# Patient Record
Sex: FEMALE | Race: WHITE | Employment: FULL TIME | URBAN - METROPOLITAN AREA
[De-identification: names, ages, dates, MRNs, and addresses within clinical notes are randomized per-mention and may not be internally consistent; named-entity substitution may affect disease eponyms.]

---

## 2017-07-17 PROCEDURE — 87338 HPYLORI STOOL AG IA: CPT | Performed by: INTERNAL MEDICINE

## 2017-07-18 PROCEDURE — 88175 CYTOPATH C/V AUTO FLUID REDO: CPT | Performed by: OBSTETRICS & GYNECOLOGY

## 2017-07-18 PROCEDURE — 87491 CHLMYD TRACH DNA AMP PROBE: CPT | Performed by: OBSTETRICS & GYNECOLOGY

## 2017-07-18 PROCEDURE — 87591 N.GONORRHOEAE DNA AMP PROB: CPT | Performed by: OBSTETRICS & GYNECOLOGY

## 2017-08-01 PROCEDURE — 82656 EL-1 FECAL QUAL/SEMIQ: CPT | Performed by: INTERNAL MEDICINE

## 2017-08-01 PROCEDURE — 82705 FATS/LIPIDS FECES QUAL: CPT | Performed by: INTERNAL MEDICINE

## 2017-08-22 PROCEDURE — 88305 TISSUE EXAM BY PATHOLOGIST: CPT | Performed by: INTERNAL MEDICINE

## 2017-08-23 PROCEDURE — 87272 CRYPTOSPORIDIUM AG IF: CPT | Performed by: INTERNAL MEDICINE

## 2017-08-23 PROCEDURE — 87177 OVA AND PARASITES SMEARS: CPT | Performed by: INTERNAL MEDICINE

## 2017-08-23 PROCEDURE — 87329 GIARDIA AG IA: CPT | Performed by: INTERNAL MEDICINE

## 2017-08-23 PROCEDURE — 87209 SMEAR COMPLEX STAIN: CPT | Performed by: INTERNAL MEDICINE

## 2017-09-26 PROBLEM — B35.3 TINEA PEDIS, RIGHT: Status: ACTIVE | Noted: 2017-09-26

## 2017-10-18 PROCEDURE — 87591 N.GONORRHOEAE DNA AMP PROB: CPT | Performed by: OBSTETRICS & GYNECOLOGY

## 2017-10-18 PROCEDURE — 87491 CHLMYD TRACH DNA AMP PROBE: CPT | Performed by: OBSTETRICS & GYNECOLOGY

## 2017-11-03 PROCEDURE — 82943 ASSAY OF GLUCAGON: CPT | Performed by: INTERNAL MEDICINE

## 2017-11-03 PROCEDURE — 82308 ASSAY OF CALCITONIN: CPT | Performed by: INTERNAL MEDICINE

## 2017-11-03 PROCEDURE — 82941 ASSAY OF GASTRIN: CPT | Performed by: INTERNAL MEDICINE

## 2018-05-08 PROCEDURE — 81001 URINALYSIS AUTO W/SCOPE: CPT | Performed by: FAMILY MEDICINE

## 2018-05-08 PROCEDURE — 87086 URINE CULTURE/COLONY COUNT: CPT | Performed by: FAMILY MEDICINE

## 2019-06-20 PROCEDURE — 87046 STOOL CULTR AEROBIC BACT EA: CPT | Performed by: INTERNAL MEDICINE

## 2019-06-20 PROCEDURE — 87177 OVA AND PARASITES SMEARS: CPT | Performed by: INTERNAL MEDICINE

## 2019-06-20 PROCEDURE — 87272 CRYPTOSPORIDIUM AG IF: CPT | Performed by: INTERNAL MEDICINE

## 2019-06-20 PROCEDURE — 87329 GIARDIA AG IA: CPT | Performed by: INTERNAL MEDICINE

## 2019-06-20 PROCEDURE — 87209 SMEAR COMPLEX STAIN: CPT | Performed by: INTERNAL MEDICINE

## 2019-06-20 PROCEDURE — 87045 FECES CULTURE AEROBIC BACT: CPT | Performed by: INTERNAL MEDICINE

## 2019-06-20 PROCEDURE — 87427 SHIGA-LIKE TOXIN AG IA: CPT | Performed by: INTERNAL MEDICINE

## 2019-08-13 PROCEDURE — 87624 HPV HI-RISK TYP POOLED RSLT: CPT | Performed by: OBSTETRICS & GYNECOLOGY

## 2019-08-13 PROCEDURE — 88175 CYTOPATH C/V AUTO FLUID REDO: CPT | Performed by: OBSTETRICS & GYNECOLOGY

## 2025-02-06 ENCOUNTER — HOSPITAL ENCOUNTER (INPATIENT)
Facility: HOSPITAL | Age: 30
LOS: 4 days | Discharge: HOME | End: 2025-02-10
Attending: OBSTETRICS & GYNECOLOGY | Admitting: OBSTETRICS & GYNECOLOGY
Payer: COMMERCIAL

## 2025-02-06 ENCOUNTER — ANESTHESIA EVENT (OUTPATIENT)
Dept: OBGYN UNIT | Facility: HOSPITAL | Age: 30
End: 2025-02-06
Payer: COMMERCIAL

## 2025-02-06 ENCOUNTER — ANESTHESIA (OUTPATIENT)
Dept: OBGYN UNIT | Facility: HOSPITAL | Age: 30
End: 2025-02-06
Payer: COMMERCIAL

## 2025-02-06 PROBLEM — Z34.90 PREGNANCY (HCC): Status: ACTIVE | Noted: 2025-02-06

## 2025-02-06 LAB
ANTIBODY SCREEN: NEGATIVE
BASOPHILS # BLD AUTO: 0.05 X10(3) UL (ref 0–0.2)
BASOPHILS NFR BLD AUTO: 0.3 %
EOSINOPHIL # BLD AUTO: 0.07 X10(3) UL (ref 0–0.7)
EOSINOPHIL NFR BLD AUTO: 0.5 %
ERYTHROCYTE [DISTWIDTH] IN BLOOD BY AUTOMATED COUNT: 12.3 %
HCT VFR BLD AUTO: 33.8 %
HGB BLD-MCNC: 11.9 G/DL
IMM GRANULOCYTES # BLD AUTO: 0.16 X10(3) UL (ref 0–1)
IMM GRANULOCYTES NFR BLD: 1 %
LYMPHOCYTES # BLD AUTO: 2.48 X10(3) UL (ref 1–4)
LYMPHOCYTES NFR BLD AUTO: 16.1 %
MCH RBC QN AUTO: 31.5 PG (ref 26–34)
MCHC RBC AUTO-ENTMCNC: 35.2 G/DL (ref 31–37)
MCV RBC AUTO: 89.4 FL
MONOCYTES # BLD AUTO: 0.85 X10(3) UL (ref 0.1–1)
MONOCYTES NFR BLD AUTO: 5.5 %
NEUTROPHILS # BLD AUTO: 11.76 X10 (3) UL (ref 1.5–7.7)
NEUTROPHILS # BLD AUTO: 11.76 X10(3) UL (ref 1.5–7.7)
NEUTROPHILS NFR BLD AUTO: 76.6 %
PLATELET # BLD AUTO: 164 10(3)UL (ref 150–450)
RBC # BLD AUTO: 3.78 X10(6)UL
RH BLOOD TYPE: NEGATIVE
T PALLIDUM AB SER QL IA: NONREACTIVE
WBC # BLD AUTO: 15.4 X10(3) UL (ref 4–11)

## 2025-02-06 RX ORDER — BUPIVACAINE HCL/0.9 % NACL/PF 0.25 %
5 PLASTIC BAG, INJECTION (ML) EPIDURAL AS NEEDED
Status: DISCONTINUED | OUTPATIENT
Start: 2025-02-06 | End: 2025-02-07

## 2025-02-06 RX ORDER — TERBUTALINE SULFATE 1 MG/ML
0.25 INJECTION SUBCUTANEOUS AS NEEDED
Status: DISCONTINUED | OUTPATIENT
Start: 2025-02-06 | End: 2025-02-07 | Stop reason: HOSPADM

## 2025-02-06 RX ORDER — SODIUM CHLORIDE, SODIUM LACTATE, POTASSIUM CHLORIDE, CALCIUM CHLORIDE 600; 310; 30; 20 MG/100ML; MG/100ML; MG/100ML; MG/100ML
INJECTION, SOLUTION INTRAVENOUS CONTINUOUS
Status: DISCONTINUED | OUTPATIENT
Start: 2025-02-06 | End: 2025-02-07 | Stop reason: HOSPADM

## 2025-02-06 RX ORDER — NALBUPHINE HYDROCHLORIDE 10 MG/ML
2.5 INJECTION INTRAMUSCULAR; INTRAVENOUS; SUBCUTANEOUS
Status: DISCONTINUED | OUTPATIENT
Start: 2025-02-06 | End: 2025-02-07

## 2025-02-06 RX ORDER — DEXTROSE, SODIUM CHLORIDE, SODIUM LACTATE, POTASSIUM CHLORIDE, AND CALCIUM CHLORIDE 5; .6; .31; .03; .02 G/100ML; G/100ML; G/100ML; G/100ML; G/100ML
INJECTION, SOLUTION INTRAVENOUS AS NEEDED
Status: DISCONTINUED | OUTPATIENT
Start: 2025-02-06 | End: 2025-02-07 | Stop reason: HOSPADM

## 2025-02-06 RX ORDER — ACETAMINOPHEN 500 MG
1000 TABLET ORAL EVERY 6 HOURS PRN
Status: DISCONTINUED | OUTPATIENT
Start: 2025-02-06 | End: 2025-02-07 | Stop reason: HOSPADM

## 2025-02-06 RX ORDER — LIDOCAINE HYDROCHLORIDE AND EPINEPHRINE 15; 5 MG/ML; UG/ML
5 INJECTION, SOLUTION EPIDURAL AS NEEDED
Status: DISCONTINUED | OUTPATIENT
Start: 2025-02-06 | End: 2025-02-07

## 2025-02-06 RX ORDER — IBUPROFEN 600 MG/1
600 TABLET, FILM COATED ORAL ONCE AS NEEDED
Status: DISCONTINUED | OUTPATIENT
Start: 2025-02-06 | End: 2025-02-07 | Stop reason: HOSPADM

## 2025-02-06 RX ORDER — BUPIVACAINE HYDROCHLORIDE 2.5 MG/ML
30 INJECTION, SOLUTION EPIDURAL; INFILTRATION; INTRACAUDAL AS NEEDED
Status: DISCONTINUED | OUTPATIENT
Start: 2025-02-06 | End: 2025-02-07

## 2025-02-06 RX ORDER — ACETAMINOPHEN 500 MG
500 TABLET ORAL EVERY 6 HOURS PRN
Status: DISCONTINUED | OUTPATIENT
Start: 2025-02-06 | End: 2025-02-07 | Stop reason: HOSPADM

## 2025-02-06 RX ORDER — SODIUM CHLORIDE 9 MG/ML
10 INJECTION, SOLUTION INTRAMUSCULAR; INTRAVENOUS; SUBCUTANEOUS AS NEEDED
Status: DISCONTINUED | OUTPATIENT
Start: 2025-02-06 | End: 2025-02-07

## 2025-02-06 RX ORDER — LIDOCAINE HYDROCHLORIDE AND EPINEPHRINE 15; 5 MG/ML; UG/ML
INJECTION, SOLUTION EPIDURAL AS NEEDED
Status: DISCONTINUED | OUTPATIENT
Start: 2025-02-06 | End: 2025-02-08 | Stop reason: SURG

## 2025-02-06 RX ORDER — CITRIC ACID/SODIUM CITRATE 334-500MG
30 SOLUTION, ORAL ORAL AS NEEDED
Status: DISCONTINUED | OUTPATIENT
Start: 2025-02-06 | End: 2025-02-07 | Stop reason: HOSPADM

## 2025-02-06 RX ORDER — LIDOCAINE HYDROCHLORIDE 20 MG/ML
5 INJECTION, SOLUTION EPIDURAL; INFILTRATION; INTRACAUDAL; PERINEURAL AS NEEDED
Status: DISCONTINUED | OUTPATIENT
Start: 2025-02-06 | End: 2025-02-07

## 2025-02-06 RX ORDER — ONDANSETRON 2 MG/ML
4 INJECTION INTRAMUSCULAR; INTRAVENOUS EVERY 6 HOURS PRN
Status: DISCONTINUED | OUTPATIENT
Start: 2025-02-06 | End: 2025-02-07 | Stop reason: HOSPADM

## 2025-02-06 RX ADMIN — LIDOCAINE HYDROCHLORIDE AND EPINEPHRINE 2 ML: 15; 5 INJECTION, SOLUTION EPIDURAL at 23:08:00

## 2025-02-06 RX ADMIN — LIDOCAINE HYDROCHLORIDE AND EPINEPHRINE 3 ML: 15; 5 INJECTION, SOLUTION EPIDURAL at 23:03:00

## 2025-02-07 LAB — RH BLOOD TYPE: NEGATIVE

## 2025-02-07 PROCEDURE — 59514 CESAREAN DELIVERY ONLY: CPT | Performed by: OBSTETRICS & GYNECOLOGY

## 2025-02-07 RX ORDER — BISACODYL 10 MG
10 SUPPOSITORY, RECTAL RECTAL ONCE AS NEEDED
Status: DISCONTINUED | OUTPATIENT
Start: 2025-02-07 | End: 2025-02-10

## 2025-02-07 RX ORDER — ONDANSETRON 2 MG/ML
INJECTION INTRAMUSCULAR; INTRAVENOUS AS NEEDED
Status: DISCONTINUED | OUTPATIENT
Start: 2025-02-07 | End: 2025-02-08 | Stop reason: SURG

## 2025-02-07 RX ORDER — BUPIVACAINE HYDROCHLORIDE 2.5 MG/ML
INJECTION, SOLUTION EPIDURAL; INFILTRATION; INTRACAUDAL AS NEEDED
Status: DISCONTINUED | OUTPATIENT
Start: 2025-02-07 | End: 2025-02-08 | Stop reason: SURG

## 2025-02-07 RX ORDER — CHOLECALCIFEROL (VITAMIN D3) 25 MCG
1 TABLET,CHEWABLE ORAL DAILY
COMMUNITY

## 2025-02-07 RX ORDER — DOCUSATE SODIUM 100 MG/1
100 CAPSULE, LIQUID FILLED ORAL
Status: DISCONTINUED | OUTPATIENT
Start: 2025-02-07 | End: 2025-02-10

## 2025-02-07 RX ORDER — TRANEXAMIC ACID 10 MG/ML
INJECTION, SOLUTION INTRAVENOUS
Status: DISPENSED
Start: 2025-02-07 | End: 2025-02-08

## 2025-02-07 RX ORDER — LIDOCAINE HCL/EPINEPHRINE/PF 2%-1:200K
VIAL (ML) INJECTION AS NEEDED
Status: DISCONTINUED | OUTPATIENT
Start: 2025-02-07 | End: 2025-02-08 | Stop reason: SURG

## 2025-02-07 RX ORDER — IBUPROFEN 600 MG/1
600 TABLET, FILM COATED ORAL EVERY 6 HOURS
Status: DISCONTINUED | OUTPATIENT
Start: 2025-02-08 | End: 2025-02-10

## 2025-02-07 RX ORDER — KETOROLAC TROMETHAMINE 30 MG/ML
30 INJECTION, SOLUTION INTRAMUSCULAR; INTRAVENOUS EVERY 6 HOURS
Status: COMPLETED | OUTPATIENT
Start: 2025-02-07 | End: 2025-02-08

## 2025-02-07 RX ORDER — METOCLOPRAMIDE HYDROCHLORIDE 5 MG/ML
INJECTION INTRAMUSCULAR; INTRAVENOUS AS NEEDED
Status: DISCONTINUED | OUTPATIENT
Start: 2025-02-07 | End: 2025-02-08 | Stop reason: SURG

## 2025-02-07 RX ORDER — DEXTROSE, SODIUM CHLORIDE, SODIUM LACTATE, POTASSIUM CHLORIDE, AND CALCIUM CHLORIDE 5; .6; .31; .03; .02 G/100ML; G/100ML; G/100ML; G/100ML; G/100ML
INJECTION, SOLUTION INTRAVENOUS CONTINUOUS PRN
Status: DISCONTINUED | OUTPATIENT
Start: 2025-02-07 | End: 2025-02-10

## 2025-02-07 RX ORDER — ACETAMINOPHEN 500 MG
1000 TABLET ORAL ONCE
Status: DISCONTINUED | OUTPATIENT
Start: 2025-02-07 | End: 2025-02-07 | Stop reason: HOSPADM

## 2025-02-07 RX ORDER — KETOROLAC TROMETHAMINE 30 MG/ML
INJECTION, SOLUTION INTRAMUSCULAR; INTRAVENOUS AS NEEDED
Status: DISCONTINUED | OUTPATIENT
Start: 2025-02-07 | End: 2025-02-08 | Stop reason: SURG

## 2025-02-07 RX ORDER — HYDROMORPHONE HYDROCHLORIDE 1 MG/ML
0.3 INJECTION, SOLUTION INTRAMUSCULAR; INTRAVENOUS; SUBCUTANEOUS EVERY 2 HOUR PRN
Status: DISCONTINUED | OUTPATIENT
Start: 2025-02-07 | End: 2025-02-10

## 2025-02-07 RX ORDER — ECHINACEA PURPUREA EXTRACT 125 MG
1 TABLET ORAL
Status: DISCONTINUED | OUTPATIENT
Start: 2025-02-07 | End: 2025-02-07

## 2025-02-07 RX ORDER — ACETAMINOPHEN 500 MG
1000 TABLET ORAL EVERY 6 HOURS
Status: DISCONTINUED | OUTPATIENT
Start: 2025-02-07 | End: 2025-02-10

## 2025-02-07 RX ORDER — GABAPENTIN 300 MG/1
300 CAPSULE ORAL EVERY 8 HOURS PRN
Status: DISCONTINUED | OUTPATIENT
Start: 2025-02-07 | End: 2025-02-10

## 2025-02-07 RX ORDER — ONDANSETRON 2 MG/ML
4 INJECTION INTRAMUSCULAR; INTRAVENOUS EVERY 6 HOURS PRN
Status: DISCONTINUED | OUTPATIENT
Start: 2025-02-07 | End: 2025-02-10

## 2025-02-07 RX ORDER — OXYCODONE HYDROCHLORIDE 5 MG/1
5 TABLET ORAL EVERY 6 HOURS PRN
Status: DISCONTINUED | OUTPATIENT
Start: 2025-02-07 | End: 2025-02-10

## 2025-02-07 RX ORDER — SIMETHICONE 80 MG
80 TABLET,CHEWABLE ORAL 3 TIMES DAILY PRN
Status: DISCONTINUED | OUTPATIENT
Start: 2025-02-07 | End: 2025-02-10

## 2025-02-07 RX ORDER — TRANEXAMIC ACID 10 MG/ML
INJECTION, SOLUTION INTRAVENOUS AS NEEDED
Status: DISCONTINUED | OUTPATIENT
Start: 2025-02-07 | End: 2025-02-08 | Stop reason: SURG

## 2025-02-07 RX ORDER — AMMONIA 15 % (W/V)
0.3 AMPUL (EA) INHALATION AS NEEDED
Status: DISCONTINUED | OUTPATIENT
Start: 2025-02-07 | End: 2025-02-10

## 2025-02-07 RX ORDER — DEXAMETHASONE SODIUM PHOSPHATE 4 MG/ML
VIAL (ML) INJECTION AS NEEDED
Status: DISCONTINUED | OUTPATIENT
Start: 2025-02-07 | End: 2025-02-08 | Stop reason: SURG

## 2025-02-07 RX ADMIN — LIDOCAINE HYDROCHLORIDE AND EPINEPHRINE 3 ML: 15; 5 INJECTION, SOLUTION EPIDURAL at 14:12:00

## 2025-02-07 RX ADMIN — LIDOCAINE HCL/EPINEPHRINE/PF 3 ML: 2%-1:200K VIAL (ML) INJECTION at 16:46:00

## 2025-02-07 RX ADMIN — BUPIVACAINE HYDROCHLORIDE 5 ML: 2.5 INJECTION, SOLUTION EPIDURAL; INFILTRATION; INTRACAUDAL at 14:18:00

## 2025-02-07 RX ADMIN — LIDOCAINE HCL/EPINEPHRINE/PF 5 ML: 2%-1:200K VIAL (ML) INJECTION at 16:42:00

## 2025-02-07 RX ADMIN — BUPIVACAINE HYDROCHLORIDE 6 ML: 2.5 INJECTION, SOLUTION EPIDURAL; INFILTRATION; INTRACAUDAL at 04:02:00

## 2025-02-07 RX ADMIN — TRANEXAMIC ACID 1000 MG: 10 INJECTION, SOLUTION INTRAVENOUS at 17:01:00

## 2025-02-07 RX ADMIN — ONDANSETRON 4 MG: 2 INJECTION INTRAMUSCULAR; INTRAVENOUS at 16:38:00

## 2025-02-07 RX ADMIN — KETOROLAC TROMETHAMINE 30 MG: 30 INJECTION, SOLUTION INTRAMUSCULAR; INTRAVENOUS at 17:27:00

## 2025-02-07 RX ADMIN — DEXAMETHASONE SODIUM PHOSPHATE 8 MG: 4 MG/ML VIAL (ML) INJECTION at 17:29:00

## 2025-02-07 RX ADMIN — LIDOCAINE HCL/EPINEPHRINE/PF 5 ML: 2%-1:200K VIAL (ML) INJECTION at 16:38:00

## 2025-02-07 RX ADMIN — METOCLOPRAMIDE HYDROCHLORIDE 10 MG: 5 INJECTION INTRAMUSCULAR; INTRAVENOUS at 16:38:00

## 2025-02-07 NOTE — ANESTHESIA PREPROCEDURE EVALUATION
PRE-OP EVALUATION    Patient Name: Isabelle Crockett    Admit Diagnosis: PREGNANCY  Pregnancy (HCC)    Pre-op Diagnosis: * No surgery found *        Anesthesia Procedure: LABOR ANALGESIA    * Surgery not found *    Pre-op vitals reviewed.  Temp: 99 °F (37.2 °C)  Pulse: 94  Resp: 19  BP: 129/83     There is no height or weight on file to calculate BMI.    Current medications reviewed.  Hospital Medications:   lactated ringers infusion   Intravenous Continuous    dextrose in lactated ringers 5% infusion   Intravenous PRN    lactated ringers IV bolus 500 mL  500 mL Intravenous PRN    acetaminophen (Tylenol Extra Strength) tab 500 mg  500 mg Oral Q6H PRN    acetaminophen (Tylenol Extra Strength) tab 1,000 mg  1,000 mg Oral Q6H PRN    ibuprofen (Motrin) tab 600 mg  600 mg Oral Once PRN    ondansetron (Zofran) 4 MG/2ML injection 4 mg  4 mg Intravenous Q6H PRN    oxyTOCIN in sodium chloride 0.9% (Pitocin) 30 Units/500mL infusion premix  62.5-900 paco-units/min Intravenous Continuous    terbutaline (Brethine) 1 MG/ML injection 0.25 mg  0.25 mg Subcutaneous PRN    sodium citrate-citric acid (Bicitra) 500-334 MG/5ML oral solution 30 mL  30 mL Oral PRN    lactated ringers IV bolus 1,000 mL  1,000 mL Intravenous Once    fentaNYL-bupivacaine 2 mcg/mL-0.125% in sodium chloride 0.9% 100 mL EPIDURAL infusion premix  12 mL/hr Epidural Continuous    fentaNYL (Sublimaze) 50 mcg/mL injection 100 mcg  100 mcg Epidural Once    lidocaine 1.5%-EPINEPHrine 1:200,000 (Xylocaine-Epinephrine) injection  5 mL Injection PRN    bupivacaine PF (Marcaine) 0.25% injection  30 mL Injection PRN    lidocaine PF (Xylocaine-MPF) 2% injection  5 mL Injection PRN    sodium chloride 0.9% PF injection 10 mL  10 mL Injection PRN    ePHEDrine (PF) 25 MG/5 ML injection 5 mg  5 mg Intravenous PRN    nalbuphine (Nubain) 10 mg/mL injection 2.5 mg  2.5 mg Intravenous Q15 Min PRN       Outpatient Medications:   Prescriptions Prior to Admission[1]    Allergies:  Patient has no known allergies.      Anesthesia Evaluation    Patient Active Problem List   Diagnosis    Tear of medial cartilage or meniscus of knee, current    Attention deficit    Tinea pedis, right    Pregnancy (HCC)        Past Medical History:    ADD (attention deficit disorder)    COVID    IBS (irritable bowel syndrome)    Ovarian cyst    STD exposure    Vertigo        Past Surgical History:   Procedure Laterality Date    Hand/finger surgery unlisted      Knee scope,med/lat menisectomy  9/19/2011    Performed by N[facility]IN[facility] at [facility], [facility]    Other surgical history  2006    lateral and medial meniscus tears right knee    Other surgical history  2/2014    L ovarian cystectomy    Other surgical history  3/2016    ORIF R hand    Other surgical history      benign breast bx, benign lumpectomy    Removal of ovarian cyst(s)      Upper gi endoscopy,diagnosis  08/22/2017    normal, gastric and SB Bx negative     Social History     Socioeconomic History    Marital status: Single   Tobacco Use    Smoking status: Never    Smokeless tobacco: Never   Vaping Use    Vaping status: Never Used   Substance and Sexual Activity    Alcohol use: Yes     Alcohol/week: 0.0 standard drinks of alcohol     Comment: 1x/wk - 2 drinks    Drug use: No    Sexual activity: Yes     Partners: Male     Birth control/protection: Rhythm     Comment: 1 male partner in past year - OCP, condom     History   Drug Use No     Available pre-op labs reviewed.  Lab Results   Component Value Date    WBC 15.4 (H) 02/06/2025    RBC 3.78 (L) 02/06/2025    HGB 11.9 (L) 02/06/2025    HCT 33.8 (L) 02/06/2025    MCV 89.4 02/06/2025    MCH 31.5 02/06/2025    MCHC 35.2 02/06/2025    RDW 12.3 02/06/2025    .0 02/06/2025               Airway      Mallampati: II  Mouth opening: >3 FB  TM distance: 4 - 6 cm  Neck ROM: full Cardiovascular      Rhythm: regular  Rate: normal     Dental    Dentition appears grossly intact          Pulmonary    Pulmonary exam normal.  Breath sounds clear to auscultation bilaterally.               Other findings              ASA: 2   Plan: epidural           Comment: I spoke with the patient and discussed the risks of epidural anesthesia, which include bleeding, infection, headache, hypotension, nerve injury, allergic reaction, and failed attempts. The patient understands and consents to receiving epidural anesthesia for labor.  Plan/risks discussed with: patient                Present on Admission:  **None**             [1]   Medications Prior to Admission   Medication Sig Dispense Refill Last Dose/Taking    amitriptyline 25 MG Oral Tab Take 12.5 mg (half tablet) daily at bedtime for the first two weeks and then increase to 25 mg (one full tablet) at bedtime daily. 90 tablet 0     predniSONE 10 MG Oral Tab Take 1 tablet (10 mg total) by mouth daily. 5 tablet 0     busPIRone 15 MG Oral Tab Take 1 tablet (15 mg total) by mouth 2 (two) times daily. 60 tablet 6

## 2025-02-07 NOTE — PLAN OF CARE
Problem: BIRTH - VAGINAL/ SECTION  Goal: Fetal and maternal status remain reassuring during the birth process  Description: INTERVENTIONS:  - Monitor vital signs  - Monitor fetal heart rate  - Monitor uterine activity  - Monitor labor progression (vaginal delivery)  - DVT prophylaxis (C/S delivery)  - Surgical antibiotic prophylaxis (C/S delivery)  Outcome: Progressing     Problem: PAIN - ADULT  Goal: Verbalizes/displays adequate comfort level or patient's stated pain goal  Description: INTERVENTIONS:  - Encourage pt to monitor pain and request assistance  - Assess pain using appropriate pain scale  - Administer analgesics based on type and severity of pain and evaluate response  - Implement non-pharmacological measures as appropriate and evaluate response  - Consider cultural and social influences on pain and pain management  - Manage/alleviate anxiety  - Utilize distraction and/or relaxation techniques  - Monitor for opioid side effects  - Notify MD/LIP if interventions unsuccessful or patient reports new pain  - Anticipate increased pain with activity and pre-medicate as appropriate  Outcome: Progressing     Problem: ANXIETY  Goal: Will report anxiety at manageable levels  Description: INTERVENTIONS:  - Administer medication as ordered  - Teach and rehearse alternative coping skills  - Provide emotional support with 1:1 interaction with staff  Outcome: Progressing     Problem: Patient/Family Goals  Goal: Patient/Family Long Term Goal  Description: Patient's Long Term Goal: Uncomplicated delivery    Interventions:  -   - See additional Care Plan goals for specific interventions  Outcome: Progressing  Goal: Patient/Family Short Term Goal  Description: Patient's Short Term Goal: Adequate Pain Control    Interventions:   -   - See additional Care Plan goals for specific interventions  Outcome: Progressing

## 2025-02-07 NOTE — H&P
Pt is 28 y/o G1 at 38w6d who presented with c/o ctx's - had been 3+ in the office - was felt to be the same initially in triage and cx was very posterior - she walked and although cx was still very posterior was felt to 5 cm dilated - she was admitted - I attempted to AROM but she was too uncomfortable to tolerate the exam - so epidural was placed.    PMH - RH neg               GB neg               H/O IBS               Left breast lumpectomy               Laparoscopy for left ovarian cyst               Right hand fx - plate and screws               Bilateral knee scopes - gymnast  /81   Pulse 75   Temp 98.9 °F (37.2 °C) (Oral)   Resp 19   Wt 190 lb (86.2 kg)   SpO2 95%   BMI 31.62 kg/m²   Lungs - clear  FHT's - baseline 125 - moderate variability - + accels - no decels - cat 1  Ctx's q 3 to 4 minutes  VE 5/80-1   AROM - blood tinged fluid  A/P Term IUP - Minimal change in exam except cx is now not as posterior - if no increase in ctx pattern over the next hour - will start pitocin.

## 2025-02-07 NOTE — ANESTHESIA PROCEDURE NOTES
Labor Analgesia    Date/Time: 2/7/2025 2:05 PM    Performed by: Lesa Black MD  Authorized by: Art Miller MD      General Information and Staff    Start Time:  2/7/2025 2:05 PM  End Time:  2/7/2025 2:12 PM  Anesthesiologist:  Lesa Black MD  Performed by:  Anesthesiologist  Patient Location:  OB  Site Identification: surface landmarks    Reason for Block: labor epidural    Preanesthetic Checklist: patient identified, IV checked, risks and benefits discussed, monitors and equipment checked, pre-op evaluation, timeout performed, IV bolus, anesthesia consent and sterile technique used      Procedure Details    Patient Position:  Sitting  Prep: ChloraPrep    Monitoring:  Heart rate and continuous pulse ox  Approach:  Midline    Epidural Needle    Injection Technique:   Needle Type:  Tuohy  Needle Gauge:  17 G  Needle Length:  3.375 in  Needle Insertion Depth:  5  Location:  L2-3    Spinal Needle      Catheter    Catheter Type:  End hole  Catheter Size:  19 G  Catheter at Skin Depth:  12  Test Dose:  Negative    Assessment    Sensory Level:  T10    Additional Comments

## 2025-02-07 NOTE — PLAN OF CARE
Problem: BIRTH - VAGINAL/ SECTION  Goal: Fetal and maternal status remain reassuring during the birth process  Description: INTERVENTIONS:  - Monitor vital signs  - Monitor fetal heart rate  - Monitor uterine activity  - Monitor labor progression (vaginal delivery)  - DVT prophylaxis (C/S delivery)  - Surgical antibiotic prophylaxis (C/S delivery)  Outcome: Completed     Problem: PAIN - ADULT  Goal: Verbalizes/displays adequate comfort level or patient's stated pain goal  Description: INTERVENTIONS:  - Encourage pt to monitor pain and request assistance  - Assess pain using appropriate pain scale  - Administer analgesics based on type and severity of pain and evaluate response  - Implement non-pharmacological measures as appropriate and evaluate response  - Consider cultural and social influences on pain and pain management  - Manage/alleviate anxiety  - Utilize distraction and/or relaxation techniques  - Monitor for opioid side effects  - Notify MD/LIP if interventions unsuccessful or patient reports new pain  - Anticipate increased pain with activity and pre-medicate as appropriate  Outcome: Completed     Problem: ANXIETY  Goal: Will report anxiety at manageable levels  Description: INTERVENTIONS:  - Administer medication as ordered  - Teach and rehearse alternative coping skills  - Provide emotional support with 1:1 interaction with staff  Outcome: Completed     Problem: Patient/Family Goals  Goal: Patient/Family Long Term Goal  Description: Patient's Long Term Goal: Uncomplicated delivery    Interventions:  -   - See additional Care Plan goals for specific interventions  Outcome: Completed  Goal: Patient/Family Short Term Goal  Description: Patient's Short Term Goal: Adequate Pain Control    Interventions:   -   - See additional Care Plan goals for specific interventions  Outcome: Completed

## 2025-02-07 NOTE — ANESTHESIA PROCEDURE NOTES
Labor Analgesia    Date/Time: 2/6/2025 10:53 PM    Performed by: Art Miller MD  Authorized by: Art Miller MD      General Information and Staff    Start Time:  2/6/2025 10:53 PM  End Time:  2/6/2025 11:02 PM  Anesthesiologist:  Art Miller MD  Performed by:  Anesthesiologist  Patient Location:  OB  Site Identification: surface landmarks    Reason for Block: labor epidural    Preanesthetic Checklist: patient identified, IV checked, risks and benefits discussed, monitors and equipment checked, pre-op evaluation, timeout performed, IV bolus, anesthesia consent and sterile technique used      Procedure Details    Patient Position:  Sitting  Prep: ChloraPrep    Monitoring:  Heart rate and continuous pulse ox  Approach:  Midline    Epidural Needle    Injection Technique:  FANTASMA saline  Needle Type:  Tuohy  Needle Gauge:  17 G  Needle Length:  3.375 in  Needle Insertion Depth:  5  Location:  L4-5    Spinal Needle      Catheter    Catheter Type:  End hole  Catheter Size:  19 G  Catheter at Skin Depth:  10  Test Dose:  Negative    Assessment      Additional Comments       Dural puncture epidural performed. Pt positioned sitting. Back prepped / draped. Local 1% lido plain infiltration of skin. 17g Touhy needle inserted @ L4-L5 interspace. FANTASMA @ 5 cm at the skin. 24g spinal needle inserted, + CSF return. Epidural catheter easily threaded to 10 cm at the skin. Negative aspiration test. 3cc 1.5% lido + epi test dose administered, negative. Catheter secured with tape and tegaderm. Pt positioned supine. Final 2cc test dose + 4 ml epidural solution + 100mcg fentanyl given as a hand bolus. Epidural pump started. Labor pain improving, pt comfortable.

## 2025-02-07 NOTE — PROGRESS NOTES
Pt is a 29 year old female admitted to TR5/TR5-A.     Chief Complaint   Patient presents with    R/o Labor      Pt is  38w5d intra-uterine pregnancy.  History obtained, consents signed. Oriented to room, staff, and plan of care.      Pt reports contractions q5min since 1700, Pt noted to be breathing c contractions at this time.  Denies vaginal bleeding or LOF.

## 2025-02-07 NOTE — PROGRESS NOTES
Pt is a 29 year old female admitted to 110/110-A.     Chief Complaint   Patient presents with    R/o Labor      Pt is  38w6d intra-uterine pregnancy.  History obtained, consents signed. Oriented to room, staff, and plan of care.

## 2025-02-07 NOTE — OPERATIVE REPORT
[facility]   Section - Operative Note    Isabelle Crockett Patient Status:  Inpatient    10/5/1995 MRN [id number]   Location [facility] LABOR & DELIVERY Attending Dolores Baker MD   Hosp Day # 1 PCP Lorelei Francois MD       Preoperative Diagnosis:   1. IUP at 38w6d  2. Labor  3. Arrest of descent  4. Rh negative       Postoperative Diagnosis:   1. Same  2. Delivered  3. Possible endometriosis on bilateral ovaries and anterior surface of uterus    Procedure: primary Low Transverse  Section    Primary surgeon: Charisse Ruiz MD    Assistant: Jovana Gallagher MD (level 3)    Indications:  This is a 29 year old  at 38w6d who presented for labor, progressed to complete and pushed for 3+hours, underwent csection for arrest of descent    Surgical Findings: Baby: boy    Weight:  3310g  APGAR 1': 8  APGAR 5': 9      Anesthesia :epidural    EBL: see EMR    Procedure:     The patient was prepped and draped in the usual sterile fashion. Newsome in place. A time out was performed and SCD’s were placed to decrease her risk for DVT and a dose of antibiotics was given preoperatively to decrease her risk for infection. Anesthesia was found to be adequate. A Pfannenstiel skin incision was made and extended down to the level of the fascia. The fascia was incised and extended laterally with Mcgrath scissors.  The fascia was then dissected off the rectus muscles bluntly.  The peritoneal cavity was entered atraumatically and extended superiorly and inferiorly with good visualization of the bladder.  Bladder blade was inserted.  Bladder was visualized and well below operative field.   A low transverse incision was created and extended laterally using blunt finger dissection. Clear fluid noted. The infant was noted to be in the OP position.  The head was delivered and the infant was bulb suctioned.  The remainder of the body was delivered without complication.  The infant was vigorously crying. The cord was  clamped and cut after a 30 second delay and infant was handed to the awaiting neonatologist. The placenta was delivered manually intact but required more extensive manual extraction with a 3 vessel cord.      The uterus was cleared of all clots and debris and exteriorized.  The bladder blade was placed.  Uterus, tubes and ovaries were normal in appearance. The first layer of the hysterotomy was closed using 0 vicryl.  A second imbricating layer was placed with 0 vircyl.  The incision was inspected for hemostasis. The uterus was placed back into the abdominal cavity.  Re-inspection of the hysterotomy, peritomeum and rectus muscles noted them to be entirely hemostatic. The peritoneum was closed with a 2-0 vicryl stitch in a running fashion. The fascia was closed with 0 vicryl in a running fashion.   The skin was closed in a subcuticular fashion using 3-0 vicryl.  Steristrips were placed.  The sponge and instrument counts were reported to me as being correct.  The patient tolerated the procedure and was stable to the recovery room.  The infant was stable to the recovery room.    Specimen:  placenta    Drains: sims    Condition:   stable    Complications: None; patient tolerated the procedure well.    Comment: The physician surgical assist provided aid in exposure, hemostasis, closure and other intraoperative technical functions to help the surgeon carry out a safe operation and optimize results.      Charisse Ruiz MD  2/7/2025  5:31 PM

## 2025-02-07 NOTE — PROGRESS NOTES
Patient called complete at 12:20, began pushing shortly after. Asked to evaluate patient at bedside 13:30, patient stating she has intense lower back pain, VE +1 station. Discussed options with patient including bolus of epidural, replacement of epidural vs csection. Risks of the procedure were discussed with the patient.  Risks include but are not limited to risk of infection, bleeding, damage to surrounding structures including the bowel, bladder, ureters and other major pelvic organs.     Epidural replaced. Re-evaluated patient, states she is much more comfortable and would like to try pushing again.     FHT reassuring    Will continue to monitor    ================================================================  Went to bedisde while pushing. After additional 1.5 hours of pushing, still at +1 station after multiple positions and with good maternal effort. Discussed with patient possible cephalopelvic disproportion. Patient in agreement with plan for primary csection for arrest of descent.     Charge rn and anesthesia notified

## 2025-02-08 LAB
BASOPHILS # BLD AUTO: 0.06 X10(3) UL (ref 0–0.2)
BASOPHILS NFR BLD AUTO: 0.2 %
EOSINOPHIL # BLD AUTO: 0.02 X10(3) UL (ref 0–0.7)
EOSINOPHIL NFR BLD AUTO: 0.1 %
ERYTHROCYTE [DISTWIDTH] IN BLOOD BY AUTOMATED COUNT: 12.9 %
HCT VFR BLD AUTO: 29.4 %
HGB BLD-MCNC: 10.5 G/DL
IMM GRANULOCYTES # BLD AUTO: 0.21 X10(3) UL (ref 0–1)
IMM GRANULOCYTES NFR BLD: 0.8 %
LYMPHOCYTES # BLD AUTO: 1.99 X10(3) UL (ref 1–4)
LYMPHOCYTES NFR BLD AUTO: 7.7 %
MCH RBC QN AUTO: 32.3 PG (ref 26–34)
MCHC RBC AUTO-ENTMCNC: 35.7 G/DL (ref 31–37)
MCV RBC AUTO: 90.5 FL
MONOCYTES # BLD AUTO: 1.13 X10(3) UL (ref 0.1–1)
MONOCYTES NFR BLD AUTO: 4.3 %
NEUTROPHILS # BLD AUTO: 22.6 X10 (3) UL (ref 1.5–7.7)
NEUTROPHILS # BLD AUTO: 22.6 X10(3) UL (ref 1.5–7.7)
NEUTROPHILS NFR BLD AUTO: 86.9 %
PLATELET # BLD AUTO: 148 10(3)UL (ref 150–450)
RBC # BLD AUTO: 3.25 X10(6)UL
WBC # BLD AUTO: 26 X10(3) UL (ref 4–11)

## 2025-02-08 NOTE — PROGRESS NOTES
Patient transferred to mother/baby room 1113 per cart in stable condition with baby and personal belongings.  Accompanied by  and staff.  Report given to mother/baby RN.

## 2025-02-08 NOTE — PROGRESS NOTES
POD #1  Pt without complaints - adequate pain control  /75 (BP Location: Right arm)   Pulse 83   Temp 97.7 °F (36.5 °C) (Oral)   Resp 18   Wt 190 lb (86.2 kg)   SpO2 99%   Breastfeeding No   BMI 31.62 kg/m²   Incision - C/D/I  Lochia - appropriate  Fundus firm at U-2  Extrem - NT  WBC - 26K  Hgb - 10.5  A/P Doing well - Will do circ tomorrow     Repeat CBC for downward trend in WBC     Advance diet     Nursing

## 2025-02-08 NOTE — PROGRESS NOTES
[facility] 1SW-J dory    Isabelle Crockett Patient Status:  Inpatient   Age/Gender 29 year old female MRN [id number]   Location [facility] 1SW-J Attending Dolores Baker MD   Hosp Day # 2 PCP Lorelei Francois MD      Anesthesia Pain Progress Note    Anesthesia Technique:   Epidural Anesthesia     Pain Management Technique:  In addition to available oral supplemental and IV medications  Patient received neuraxial preservative free morphine for post procedural pain control.    Post Procedure Pain Quality:    Adequate    Pain Management Side Effects:  None     /75 (BP Location: Right arm)   Pulse 83   Temp 97.7 °F (36.5 °C) (Oral)   Resp 18   Wt 86.2 kg (190 lb)   SpO2 99%   Breastfeeding No   BMI 31.62 kg/m²       Injection Site: Injection site is intact on inspection     Complications from Pain Management or Anesthesia:   None    All patient questions were answered.  Follow up pain management is separate from intraoperative anesthetic needs.  Pain care is transitioned to primary service, with management by oral medications.    Patient is upbeat, overall pleased with care.    Thank you for asking us to participate in the care of your patient.    Lit Mckeon MD, 25, 10:00 AM      Lit Mckeon MD, 25, 10:00 AM       Please let patient or caregiver know or leave message that refill request for BP med has/have come from the pharmacy. The refills have  been sent. The patient is due for a yearly Medicare physical and fasting labs.  Please help schedule this in the next we

## 2025-02-08 NOTE — PROGRESS NOTES
NURSING ADMISSION NOTE      Patient admitted via cart  ID bands verified with labor RN.  Oriented to room, care of plan reviewed and patient education discussed.  Safety precautions initiated.  Bed in low position.  Call light in reach.

## 2025-02-09 LAB
BASOPHILS # BLD AUTO: 0.04 X10(3) UL (ref 0–0.2)
BASOPHILS NFR BLD AUTO: 0.2 %
EOSINOPHIL # BLD AUTO: 0.18 X10(3) UL (ref 0–0.7)
EOSINOPHIL NFR BLD AUTO: 1 %
ERYTHROCYTE [DISTWIDTH] IN BLOOD BY AUTOMATED COUNT: 13.1 %
HCT VFR BLD AUTO: 26.2 %
HGB BLD-MCNC: 9.3 G/DL
IMM GRANULOCYTES # BLD AUTO: 0.23 X10(3) UL (ref 0–1)
IMM GRANULOCYTES NFR BLD: 1.3 %
LYMPHOCYTES # BLD AUTO: 2.29 X10(3) UL (ref 1–4)
LYMPHOCYTES NFR BLD AUTO: 13.3 %
MCH RBC QN AUTO: 32.1 PG (ref 26–34)
MCHC RBC AUTO-ENTMCNC: 35.5 G/DL (ref 31–37)
MCV RBC AUTO: 90.3 FL
MONOCYTES # BLD AUTO: 0.68 X10(3) UL (ref 0.1–1)
MONOCYTES NFR BLD AUTO: 3.9 %
NEUTROPHILS # BLD AUTO: 13.86 X10 (3) UL (ref 1.5–7.7)
NEUTROPHILS # BLD AUTO: 13.86 X10(3) UL (ref 1.5–7.7)
NEUTROPHILS NFR BLD AUTO: 80.3 %
PLATELET # BLD AUTO: 158 10(3)UL (ref 150–450)
RBC # BLD AUTO: 2.9 X10(6)UL
WBC # BLD AUTO: 17.3 X10(3) UL (ref 4–11)

## 2025-02-09 RX ORDER — DIPHENHYDRAMINE HYDROCHLORIDE, ZINC ACETATE 2; .1 G/100G; G/100G
1 CREAM TOPICAL 3 TIMES DAILY PRN
Status: DISCONTINUED | OUTPATIENT
Start: 2025-02-09 | End: 2025-02-10

## 2025-02-09 NOTE — PROGRESS NOTES
POD#2  Pt without complaints - adequate pain control  /77 (BP Location: Right arm)   Pulse 78   Temp 97.4 °F (36.3 °C) (Oral)   Resp 18   Wt 190 lb (86.2 kg)   SpO2 99%   Breastfeeding Yes   BMI 31.62 kg/m²   Fundus firm at U-2  Lochia - appropriate  Extrem - NT  WBC - 17 - down from 26  Hgb - 9.3  A/P Doing well - will proceed with circ - care discussed         Nursing         IV venofer

## 2025-02-10 VITALS
HEART RATE: 76 BPM | DIASTOLIC BLOOD PRESSURE: 81 MMHG | BODY MASS INDEX: 32 KG/M2 | RESPIRATION RATE: 16 BRPM | OXYGEN SATURATION: 99 % | WEIGHT: 190 LBS | TEMPERATURE: 98 F | SYSTOLIC BLOOD PRESSURE: 122 MMHG

## 2025-02-10 RX ORDER — HYDROCODONE BITARTRATE AND ACETAMINOPHEN 5; 325 MG/1; MG/1
1 TABLET ORAL EVERY 6 HOURS PRN
Qty: 15 TABLET | Refills: 0 | Status: SHIPPED | OUTPATIENT
Start: 2025-02-10

## 2025-02-10 RX ORDER — IBUPROFEN 600 MG/1
600 TABLET, FILM COATED ORAL EVERY 6 HOURS PRN
Qty: 25 TABLET | Refills: 0 | Status: SHIPPED | OUTPATIENT
Start: 2025-02-10

## 2025-02-10 RX ORDER — PSEUDOEPHEDRINE HCL 30 MG
100 TABLET ORAL 2 TIMES DAILY
Qty: 28 CAPSULE | Refills: 0 | Status: SHIPPED | OUTPATIENT
Start: 2025-02-10

## 2025-02-10 NOTE — DISCHARGE SUMMARY
[facility]   part of [facility]    Discharge Summary    Isabelle Crockett Patient Status:  Inpatient    10/5/1995 MRN [id number]   Location [facility] 1SW-J Attending Dolores Baker MD   Hosp Day # 4 PCP Lorelei Francois MD     Date of Admission: 2025 Disposition: Home    Date of Discharge: 2/10/2025    Admitting Diagnosis: PREGNANCY  Pregnancy ([facility])    Discharge Diagnosis:   Patient Active Problem List   Diagnosis    Tear of medial cartilage or meniscus of knee, current    Attention deficit    Tinea pedis, right    Pregnancy ([facility])     delivery delivered ([facility])       Reason for Admission: labor management    Physical Exam:   Vitals:    02/10/25 0740   BP: 122/81   Pulse: 76   Resp: 16   Temp: 97.5 °F (36.4 °C)       General: No acute distress  Pulm: nonlabored breathing  Cardiac: regular rate  Abd: soft, nontender, fundus firm below umbilicus, incision clean, dry, intact with steri strips  Ext: nontender lower extremities       History of Present Illness:   Pt is 30 y/o G1 at 38w6d who presented with c/o ctx's - had been 3+ in the office - was felt to be the same initially in triage and cx was very posterior - she walked and although cx was still very posterior was felt to 5 cm dilated - she was admitted - I attempted to AROM but she was too uncomfortable to tolerate the exam - so epidural was placed.    PMH - RH neg               GB neg               H/O IBS               Left breast lumpectomy               Laparoscopy for left ovarian cyst               Right hand fx - plate and screws               Bilateral knee scopes - [facility]    Hospital Course:   Patient was admitted in labor, she progressed to complete and then pushed for ~3 hours. Underwent PLTCS for arrest of descent. Narrow pelvis noted and baby in OP position. Postpartum course uncomplicated, she received IV iron.     Consultations: anesthesia    Procedures: PLTCS    Complications: none    Discharge Condition:  Good         Discharge Medications:      Discharge Medications        START taking these medications        Instructions Prescription details   docusate sodium 100 MG Caps  Commonly known as: COLACE      Take 100 mg by mouth 2 (two) times daily.   Quantity: 28 capsule  Refills: 0     HYDROcodone-acetaminophen 5-325 MG Tabs  Commonly known as: Norco      Take 1 tablet by mouth every 6 (six) hours as needed for Pain.   Quantity: 15 tablet  Refills: 0     ibuprofen 600 MG Tabs  Commonly known as: Motrin      Take 1 tablet (600 mg total) by mouth every 6 (six) hours as needed for Pain.   Quantity: 25 tablet  Refills: 0            CONTINUE taking these medications        Instructions Prescription details   amitriptyline 25 MG Tabs  Commonly known as: Elavil      Take 12.5 mg (half tablet) daily at bedtime for the first two weeks and then increase to 25 mg (one full tablet) at bedtime daily.   Quantity: 90 tablet  Refills: 0     prenatal vitamin with DHA 27-0.8-228 MG Caps      Take 1 capsule by mouth daily.   Refills: 0               Where to Get Your Medications        These medications were sent to [id number] - [place], [place] - [address]  [phone], [phone]  [address] [place] [address]      Phone: [phone]   docusate sodium 100 MG Caps  HYDROcodone-acetaminophen 5-325 MG Tabs  ibuprofen 600 MG Tabs         Follow up Visits: Follow-up with Dr. Ruiz in 6 week      Charisse Brisa Ruiz MD  2/10/2025  8:13 AM

## 2025-02-10 NOTE — PROGRESS NOTES
Discharge instructions reviewed with, and copy given to, patient.  Patient verbalized understanding of all instructions, and denied further questions.  Pt discharged in stable condition, with infant and fob.

## 2025-02-10 NOTE — PLAN OF CARE
Problem: POSTPARTUM  Goal: Long Term Goal:Experiences normal postpartum course  Description: INTERVENTIONS:  - Assess and monitor vital signs and lab values.  - Assess fundus and lochia.  - Provide ice/sitz baths for perineum discomfort.  - Monitor healing of incision/episiotomy/laceration, and assess for signs and symptoms of infection and hematoma.  - Assess bladder function and monitor for bladder distention.  - Provide/instruct/assist with pericare as needed.  - Provide VTE prophylaxis as needed.  - Monitor bowel function.  - Encourage ambulation and provide assistance as needed.  - Assess and monitor emotional status and provide social service/psych resources as needed.  - Utilize standard precautions and use personal protective equipment as indicated. Ensure aseptic care of all intravenous lines and invasive tubes/drains.  - Obtain immunization and exposure to communicable diseases history.  Outcome: Completed  Goal: Optimize infant feeding at the breast  Description: INTERVENTIONS:  - Initiate breast feeding within first hour after birth.   - Monitor effectiveness of current breast feeding efforts.  - Assess support systems available to mother/family.  - Identify cultural beliefs/practices regarding lactation, letdown techniques, maternal food preferences.  - Assess mother's knowledge and previous experience with breast feeding.  - Provide information as needed about early infant feeding cues (e.g., rooting, lip smacking, sucking fingers/hand) versus late cue of crying.  - Discuss/demonstrate breast feeding aids (e.g., infant sling, nursing footstool/pillows, and breast pumps).  - Encourage mother/other family members to express feelings/concerns, and actively listen.  - Educate father/SO about benefits of breast feeding and how to manage common lactation challenges.  - Recommend avoidance of specific medications or substances incompatible with breast feeding.  - Assess and monitor for signs of nipple  pain/trauma.  - Instruct and provide assistance with proper latch.  - Review techniques for milk expression (breast pumping) and storage of breast milk. Provide pumping equipment/supplies, instructions and assistance, as needed.  - Encourage rooming-in and breast feeding on demand.  - Encourage skin-to-skin contact.  - Provide LC support as needed.  - Assess for and manage engorgement.  - Provide breast feeding education handouts and information on community breast feeding support.   Outcome: Completed  Goal: Establishment of adequate milk supply with medication/procedure interruptions  Description: INTERVENTIONS:  - Review techniques for milk expression (breast pumping).   - Provide pumping equipment/supplies, instructions, and assistance until it is safe to breastfeed infant.  Outcome: Completed  Goal: Appropriate maternal -  bonding  Description: INTERVENTIONS:  - Assess caregiver- interactions.  - Assess caregiver's emotional status and coping mechanisms.  - Encourage caregiver to participate in  daily care.  - Assess support systems available to mother/family.  - Provide /case management support as needed.  Outcome: Completed     Problem: GASTROINTESTINAL - ADULT  Goal: Achieves appropriate nutritional intake (bariatric)  Description: INTERVENTIONS:  - Monitor for over-consumption  - Identify factors contributing to increased intake, treat as appropriate  - Monitor I&O, WT and lab values  - Obtain nutritional consult as needed  - Evaluate psychosocial factors contributing to over-consumption  Outcome: Completed

## 2025-02-11 NOTE — PAYOR COMM NOTE
--------------  ADMISSION REVIEW     Payor: [employer]/HMO/POS/EPO  Subscriber #:  [id number]  Authorization Number: [id number]    Admit date: 25  Admit time:  9:29 PM       :    NURSING:        Chief Complaint   Patient presents with    R/o Labor      Pt is  38w5d intra-uterine pregnancy.  History obtained, consents signed. Oriented to room, staff, and plan of care.        Pt reports contractions q5min since 1700, Pt noted to be breathing c contractions at this time.  Denies vaginal bleeding or LOF.       :    OBGYN:    Pt is 30 y/o G1 at 38w6d who presented with c/o ctx's - had been 3+ in the office - was felt to be the same initially in triage and cx was very posterior - she walked and although cx was still very posterior was felt to 5 cm dilated - she was admitted - I attempted to AROM but she was too uncomfortable to tolerate the exam - so epidural was placed.    PMH - RH neg               GB neg               H/O IBS               Left breast lumpectomy               Laparoscopy for left ovarian cyst               Right hand fx - plate and screws               Bilateral knee scopes - gymnast  /81   Pulse 75   Temp 98.9 °F (37.2 °C) (Oral)   Resp 19   Wt 190 lb (86.2 kg)   SpO2 95%   BMI 31.62 kg/m²   Lungs - clear  FHT's - baseline 125 - moderate variability - + accels - no decels - cat 1  Ctx's q 3 to 4 minutes  VE 5/80-1   AROM - blood tinged fluid  A/P Term IUP - Minimal change in exam except cx is now not as posterior - if no increase in ctx pattern over the next hour - will start pitocin.     Section - Operative Note           Isabelle Crockett Patient Status:  Inpatient    10/5/1995 MRN [id number]   [facility] LABOR & DELIVERY Attending Dolores Baker MD   Hosp Day # 1 PCP Lorelei Francois MD         Preoperative Diagnosis:   1. IUP at 38w6d  2. Labor  3. Arrest of descent  4. Rh negative                                            Postoperative Diagnosis:   1. Same  2. Delivered  3. Possible endometriosis on bilateral ovaries and anterior surface of uterus     Procedure: primary Low Transverse  Section     Indications:  This is a 29 year old  at 38w6d who presented for labor, progressed to complete and pushed for 3+hours, underwent csection for arrest of descent     Surgical Findings: Baby: boy     Weight:  3310g  APGAR 1': 8  APGAR 5': 9       2/10:     DC HOME

## 2025-02-12 ENCOUNTER — TELEPHONE (OUTPATIENT)
Dept: OBGYN UNIT | Facility: HOSPITAL | Age: 30
End: 2025-02-12

## 2025-02-24 NOTE — ANESTHESIA POSTPROCEDURE EVALUATION
[facility]    Isabelle Crockett Patient Status:  Inpatient   Age/Gender 29 year old female MRN [id number]   Location [facility] 1SW-J Attending No att. providers found   Hosp Day # 4 PCP Lorelei Francois MD       Anesthesia Post-op Note     SECTION    Procedure Summary       Date: 25 Room / Location:  L+D OR   L+D OR    Anesthesia Start:  Anesthesia Stop: 25    Procedure:  SECTION (Abdomen) Diagnosis: (Same)    Surgeons: Charisse Ruiz MD Anesthesiologist: Nolan Caceres MD    Anesthesia Type: epidural ASA Status: 2            Anesthesia Type: epidural    Vitals Value Taken Time   /81 02/10/25 0740   Temp 97.5 °F (36.4 °C) 02/10/25 0740   Pulse 76 02/10/25 0740   Resp 16 02/10/25 0740   SpO2 99 % 25 1600           Patient Location: PACU    Anesthesia Type: epidural

## 2025-03-26 RX ORDER — TRETINOIN 0.5 MG/G
CREAM TOPICAL
COMMUNITY
Start: 2023-09-11 | End: 2025-03-27

## 2025-03-26 RX ORDER — TRIAMCINOLONE ACETONIDE 1 MG/G
CREAM TOPICAL
COMMUNITY
Start: 2025-03-11 | End: 2025-03-27

## 2025-03-27 ENCOUNTER — ANESTHESIA (OUTPATIENT)
Dept: SURGERY | Facility: HOSPITAL | Age: 30
End: 2025-03-27
Payer: COMMERCIAL

## 2025-03-27 ENCOUNTER — APPOINTMENT (OUTPATIENT)
Dept: ULTRASOUND IMAGING | Facility: HOSPITAL | Age: 30
End: 2025-03-27
Attending: STUDENT IN AN ORGANIZED HEALTH CARE EDUCATION/TRAINING PROGRAM
Payer: COMMERCIAL

## 2025-03-27 ENCOUNTER — ANESTHESIA EVENT (OUTPATIENT)
Dept: SURGERY | Facility: HOSPITAL | Age: 30
End: 2025-03-27
Payer: COMMERCIAL

## 2025-03-27 ENCOUNTER — HOSPITAL ENCOUNTER (OUTPATIENT)
Facility: HOSPITAL | Age: 30
Setting detail: HOSPITAL OUTPATIENT SURGERY
Discharge: HOME | End: 2025-03-27
Attending: STUDENT IN AN ORGANIZED HEALTH CARE EDUCATION/TRAINING PROGRAM | Admitting: STUDENT IN AN ORGANIZED HEALTH CARE EDUCATION/TRAINING PROGRAM
Payer: COMMERCIAL

## 2025-03-27 VITALS
RESPIRATION RATE: 16 BRPM | WEIGHT: 163 LBS | HEIGHT: 65 IN | SYSTOLIC BLOOD PRESSURE: 106 MMHG | BODY MASS INDEX: 27.16 KG/M2 | OXYGEN SATURATION: 100 % | HEART RATE: 69 BPM | DIASTOLIC BLOOD PRESSURE: 76 MMHG | TEMPERATURE: 97 F

## 2025-03-27 PROCEDURE — 10D18ZZ EXTRACTION OF PRODUCTS OF CONCEPTION, RETAINED, VIA NATURAL OR ARTIFICIAL OPENING ENDOSCOPIC: ICD-10-PCS | Performed by: STUDENT IN AN ORGANIZED HEALTH CARE EDUCATION/TRAINING PROGRAM

## 2025-03-27 PROCEDURE — 88305 TISSUE EXAM BY PATHOLOGIST: CPT | Performed by: STUDENT IN AN ORGANIZED HEALTH CARE EDUCATION/TRAINING PROGRAM

## 2025-03-27 PROCEDURE — 76998 US GUIDE INTRAOP: CPT | Performed by: STUDENT IN AN ORGANIZED HEALTH CARE EDUCATION/TRAINING PROGRAM

## 2025-03-27 RX ORDER — SODIUM CHLORIDE, SODIUM LACTATE, POTASSIUM CHLORIDE, CALCIUM CHLORIDE 600; 310; 30; 20 MG/100ML; MG/100ML; MG/100ML; MG/100ML
INJECTION, SOLUTION INTRAVENOUS CONTINUOUS
Status: DISCONTINUED | OUTPATIENT
Start: 2025-03-27 | End: 2025-03-27

## 2025-03-27 RX ORDER — PROCHLORPERAZINE EDISYLATE 5 MG/ML
5 INJECTION INTRAMUSCULAR; INTRAVENOUS EVERY 8 HOURS PRN
Status: DISCONTINUED | OUTPATIENT
Start: 2025-03-27 | End: 2025-03-27

## 2025-03-27 RX ORDER — ONDANSETRON 2 MG/ML
4 INJECTION INTRAMUSCULAR; INTRAVENOUS EVERY 6 HOURS PRN
Status: DISCONTINUED | OUTPATIENT
Start: 2025-03-27 | End: 2025-03-27

## 2025-03-27 RX ORDER — DEXAMETHASONE SODIUM PHOSPHATE 4 MG/ML
VIAL (ML) INJECTION AS NEEDED
Status: DISCONTINUED | OUTPATIENT
Start: 2025-03-27 | End: 2025-03-27 | Stop reason: SURG

## 2025-03-27 RX ORDER — MISOPROSTOL 200 UG/1
TABLET ORAL AS NEEDED
Status: DISCONTINUED | OUTPATIENT
Start: 2025-03-27 | End: 2025-03-27 | Stop reason: HOSPADM

## 2025-03-27 RX ORDER — KETOROLAC TROMETHAMINE 30 MG/ML
INJECTION, SOLUTION INTRAMUSCULAR; INTRAVENOUS AS NEEDED
Status: DISCONTINUED | OUTPATIENT
Start: 2025-03-27 | End: 2025-03-27 | Stop reason: SURG

## 2025-03-27 RX ORDER — HYDROCODONE BITARTRATE AND ACETAMINOPHEN 5; 325 MG/1; MG/1
1 TABLET ORAL ONCE AS NEEDED
Status: DISCONTINUED | OUTPATIENT
Start: 2025-03-27 | End: 2025-03-27

## 2025-03-27 RX ORDER — MIDAZOLAM HYDROCHLORIDE 1 MG/ML
1 INJECTION INTRAMUSCULAR; INTRAVENOUS EVERY 5 MIN PRN
Status: DISCONTINUED | OUTPATIENT
Start: 2025-03-27 | End: 2025-03-27

## 2025-03-27 RX ORDER — ONDANSETRON 2 MG/ML
INJECTION INTRAMUSCULAR; INTRAVENOUS AS NEEDED
Status: DISCONTINUED | OUTPATIENT
Start: 2025-03-27 | End: 2025-03-27 | Stop reason: SURG

## 2025-03-27 RX ORDER — ACETAMINOPHEN 500 MG
1000 TABLET ORAL ONCE AS NEEDED
Status: DISCONTINUED | OUTPATIENT
Start: 2025-03-27 | End: 2025-03-27

## 2025-03-27 RX ORDER — ACETAMINOPHEN 500 MG
1000 TABLET ORAL ONCE
Status: DISCONTINUED | OUTPATIENT
Start: 2025-03-27 | End: 2025-03-27 | Stop reason: HOSPADM

## 2025-03-27 RX ORDER — MIDAZOLAM HYDROCHLORIDE 1 MG/ML
INJECTION INTRAMUSCULAR; INTRAVENOUS AS NEEDED
Status: DISCONTINUED | OUTPATIENT
Start: 2025-03-27 | End: 2025-03-27 | Stop reason: SURG

## 2025-03-27 RX ORDER — LIDOCAINE HYDROCHLORIDE 10 MG/ML
INJECTION, SOLUTION EPIDURAL; INFILTRATION; INTRACAUDAL; PERINEURAL AS NEEDED
Status: DISCONTINUED | OUTPATIENT
Start: 2025-03-27 | End: 2025-03-27 | Stop reason: SURG

## 2025-03-27 RX ORDER — HYDROMORPHONE HYDROCHLORIDE 1 MG/ML
0.6 INJECTION, SOLUTION INTRAMUSCULAR; INTRAVENOUS; SUBCUTANEOUS EVERY 5 MIN PRN
Status: DISCONTINUED | OUTPATIENT
Start: 2025-03-27 | End: 2025-03-27

## 2025-03-27 RX ORDER — HYDROCODONE BITARTRATE AND ACETAMINOPHEN 5; 325 MG/1; MG/1
2 TABLET ORAL ONCE AS NEEDED
Status: DISCONTINUED | OUTPATIENT
Start: 2025-03-27 | End: 2025-03-27

## 2025-03-27 RX ORDER — NALOXONE HYDROCHLORIDE 0.4 MG/ML
80 INJECTION, SOLUTION INTRAMUSCULAR; INTRAVENOUS; SUBCUTANEOUS AS NEEDED
Status: DISCONTINUED | OUTPATIENT
Start: 2025-03-27 | End: 2025-03-27

## 2025-03-27 RX ORDER — HYDROMORPHONE HYDROCHLORIDE 1 MG/ML
0.2 INJECTION, SOLUTION INTRAMUSCULAR; INTRAVENOUS; SUBCUTANEOUS EVERY 5 MIN PRN
Status: DISCONTINUED | OUTPATIENT
Start: 2025-03-27 | End: 2025-03-27

## 2025-03-27 RX ORDER — HYDROMORPHONE HYDROCHLORIDE 1 MG/ML
0.4 INJECTION, SOLUTION INTRAMUSCULAR; INTRAVENOUS; SUBCUTANEOUS EVERY 5 MIN PRN
Status: DISCONTINUED | OUTPATIENT
Start: 2025-03-27 | End: 2025-03-27

## 2025-03-27 RX ORDER — LABETALOL HYDROCHLORIDE 5 MG/ML
5 INJECTION, SOLUTION INTRAVENOUS EVERY 5 MIN PRN
Status: DISCONTINUED | OUTPATIENT
Start: 2025-03-27 | End: 2025-03-27

## 2025-03-27 RX ADMIN — ONDANSETRON 4 MG: 2 INJECTION INTRAMUSCULAR; INTRAVENOUS at 08:55:00

## 2025-03-27 RX ADMIN — MIDAZOLAM HYDROCHLORIDE 2 MG: 1 INJECTION INTRAMUSCULAR; INTRAVENOUS at 08:40:00

## 2025-03-27 RX ADMIN — KETOROLAC TROMETHAMINE 30 MG: 30 INJECTION, SOLUTION INTRAMUSCULAR; INTRAVENOUS at 09:30:00

## 2025-03-27 RX ADMIN — SODIUM CHLORIDE, SODIUM LACTATE, POTASSIUM CHLORIDE, CALCIUM CHLORIDE: 600; 310; 30; 20 INJECTION, SOLUTION INTRAVENOUS at 08:40:00

## 2025-03-27 RX ADMIN — LIDOCAINE HYDROCHLORIDE 50 MG: 10 INJECTION, SOLUTION EPIDURAL; INFILTRATION; INTRACAUDAL; PERINEURAL at 08:45:00

## 2025-03-27 RX ADMIN — DEXAMETHASONE SODIUM PHOSPHATE 4 MG: 4 MG/ML VIAL (ML) INJECTION at 08:55:00

## 2025-03-27 NOTE — ANESTHESIA POSTPROCEDURE EVALUATION
[facility]    Isabelle Crockett Patient Status:  Hospital Outpatient Surgery   Age/Gender 29 year old female MRN [id number]   Location [facility] PERIOPERATIVE SERVICE Attending No att. providers found   Hosp Day # 0 PCP Buddy Singh DO       Anesthesia Post-op Note    HYSTEROSCOPY, SUCTION DILATION AND CURETTAGE, ULTRASOUND GUIDANCE    Procedure Summary       Date: 03/27/25 Room / Location:  MAIN OR 04 /  MAIN OR    Anesthesia Start: 0840 Anesthesia Stop: 0951    Procedure: HYSTEROSCOPY, SUCTION DILATION AND CURETTAGE, ULTRASOUND GUIDANCE (Uterus) Diagnosis: (RETAINED PRODUCTS)    Surgeons: Charisse Ruiz MD Anesthesiologist: Kel Parish MD    Anesthesia Type: MAC ASA Status: 2            Anesthesia Type: MAC    Vitals Value Taken Time   /76 03/27/25 1952   Temp 97 °F (36.1 °C) 03/27/25 0952   Pulse 72 03/27/25 0952   Resp 16 03/27/25 0952   SpO2 100 % 03/27/25 0952   Vitals shown include unfiled device data.        Patient Location: Same Day Surgery    Anesthesia Type: MAC    Airway Patency: patent    Postop Pain Control: adequate    Mental Status: mildly sedated but able to meaningfully participate in the post-anesthesia evaluation    Nausea/Vomiting: none    Cardiopulmonary/Hydration status: stable euvolemic    Complications: no apparent anesthesia related complications    Postop vital signs: stable    Dental Exam: Unchanged from Preop    Patient to be discharged home when criteria met.

## 2025-03-27 NOTE — H&P
[facility]  History & Physical    Isabelle Crockett Patient Status:  Hospital Outpatient Surgery    10/5/1995 MRN [id number]   Location [facility] PERIOPERATIVE SERVICE Attending Charisse Ruiz MD   Hosp Day # 0 PCP Buddy Singh DO     SUBJECTIVE:    Reason for Admission:  Removal of retained products of conception    History of Present Illness:  Patient is a(n) 29 year old female  who presents for scheduled hysteroscopic removal of products of conception, suction D&C with ultrasound guidance. Patient is s/p PLTCS on  for arrest of descent. She was seen on 3/24 for postpartum visit and endorsed some ongoing bleeding. Ultrasound done and showed thickened endometrium with vascularity concerning for retained POC. Bleeding has been minimal. Denies fevers, chills, abdominal pain.     Medications:  Prescriptions Prior to Admission[1]    Allergies:  Allergies[2]     Past Medical History:  Past Medical History:    ADD (attention deficit disorder)    Arrhythmia    PALPITATIONS    Blood disorder    IRON ANEMIA, IRON INFUSION 2025    COVID    IBS (irritable bowel syndrome)    Ovarian cyst    PONV (postoperative nausea and vomiting)    STD exposure    Vertigo    Visual impairment    READING GLASSES       Past Surgical History:  Past Surgical History:   Procedure Laterality Date    Hand/finger surgery unlisted      Knee scope,med/lat menisectomy  2011    Performed by NILS HINDS at [facility] [place]    Other surgical history      lateral and medial meniscus tears right knee    Other surgical history  2014    L ovarian cystectomy    Other surgical history  3/2016    ORIF R hand    Other surgical history      benign breast bx, benign lumpectomy    Removal of ovarian cyst(s)      Upper gi endoscopy,diagnosis  2017    normal, gastric and SB Bx negative       Past OB History:  OB History    Para Term  AB Living   1 1 1 0 0 1   SAB IAB Ectopic Multiple  Live Births   0 0 0 0 1      # Outcome Date GA Lbr Emil/2nd Weight Sex Type Anes PTL Lv   1 Term 25 38w6d 14:52 / 04:32 7 lb 9.6 oz (3.446 kg) M Caesarean EPI N MARISA      Complications: Other - see comments, Variable decelerations      Obstetric Comments   Regular menses on OCP, DUB prior   Chlamydia at 17yo         Social History:  Social History     Tobacco Use    Smoking status: Never    Smokeless tobacco: Never   Substance Use Topics    Alcohol use: Not Currently     Comment: 1x/wk - 2 drinks        Family History:  Family History   Problem Relation Age of Onset    Hypertension Mother     Thyroid disease Sister     Diabetes Maternal Grandmother     Heart Attack Maternal Grandmother     Cancer Maternal Grandfather         bladder    Breast Cancer Neg        Review of Systems:  Non-contributory    OBJECTIVE:       No intake or output data in the 24 hours ending 25 0744    Physical Exam:  General: Alert, orientated x3. .  Vital Signs:  Height 5' 5\" (1.651 m), weight 165 lb (74.8 kg), not currently breastfeeding.. VSS Afebrile  Lungs: nonlabored breathing  Cardiac: Regular rate   Abdomen:  Soft, non-distended, non-tender.  Pelvic: deferred to OR  Skin: Normal texture         Diagnostics:    Pelvic ultrasound showing probable retained products of conception with ET 20mm with vascularity.     Data Review:        ASSESSMENT/PLAN:  Patient is a(n) 29 year old female  who presents for scheduled hysteroscopic removal of products of conception, suction D&C with ultrasound guidance.    -- discussed risks of procedure including bleeding, infection, injury to surrounding structures. Written consents signed  -- npo/ivf  -- doxycycline 200mg IV preop  -- has ibuprofen PRN at home for postop pain      All of the findings and plan were discussed with the patient.  She notes understanding and agrees with the plan of care. She understands the risks and complications of the procedure; including but not limited to  bleeding, infection, trauma to GI and  tracts.   All questions were answered.    Charisse Ruiz MD  3/27/2025  7:44 AM         [1]   Medications Prior to Admission   Medication Sig Dispense Refill Last Dose/Taking    Tretinoin 0.05 % External Cream Apply a pea sized amount to the face and back   Taking    triamcinolone 0.1 % External Cream Apply to affected area of the left antecubital bid for up to 2 weeks, follow with a two week break. Repeat cycle as needed.   Taking    prenatal vitamin with DHA 27-0.8-228 MG Oral Cap Take 1 capsule by mouth daily.   Taking    docusate sodium 100 MG Oral Cap Take 100 mg by mouth 2 (two) times daily. (Patient not taking: Reported on 3/26/2025) 28 capsule 0 Not Taking    ibuprofen 600 MG Oral Tab Take 1 tablet (600 mg total) by mouth every 6 (six) hours as needed for Pain. (Patient not taking: Reported on 3/26/2025) 25 tablet 0 Not Taking    HYDROcodone-acetaminophen 5-325 MG Oral Tab Take 1 tablet by mouth every 6 (six) hours as needed for Pain. (Patient not taking: Reported on 3/26/2025) 15 tablet 0 Not Taking    amitriptyline 25 MG Oral Tab Take 12.5 mg (half tablet) daily at bedtime for the first two weeks and then increase to 25 mg (one full tablet) at bedtime daily. (Patient not taking: Reported on 3/26/2025) 90 tablet 0 Not Taking   [2]   Allergies  Allergen Reactions    Adhesive Tape HIVES and RASH

## 2025-03-27 NOTE — OPERATIVE REPORT
[facility]    Isabelle Crockett Patient Status:  Hospital Outpatient Surgery    10/5/1995 MRN [id number]   Location [facility] PERIOPERATIVE SERVICE Attending Charisse Ruiz MD   Hosp Day # 0 PCP Buddy Singh DO     Date of procedure: 3/27/2025    Preoperative diagnosis:  Retained products of conception    Postoperative diagnosis:  same    Procedure:  Hysteroscopic removal of retained products of conception with Truclear  Suction D&C with ultrasound guidance    Surgeon: Dr.Darlene Ruiz    Anesthesia: MAC  EBL: 15cc  Antibiotics: none indicated  DVT ppx: SCDs  Fluid defecit: 450cc    Findings:  1) moderate amount of retained products     Complications: none    Specimens: products of conception    Indication for procedure: This is a 29 year old who had ongoing light bleeding at 6 week postpartum visit, ultrasound showed thickened endometrium with vascularity. The risks were reviewed with her, and she gave informed consent.    Procedure: The patient was taken to the operating room and placed under anesthesia. She received antibiotics. She was prepped and draped in sterile fashion in lithotomy position. A speculum was placed into the vagina, and her cervix was grasped with a single tooth tenaculum.  The cervix was found to be dilated.     The 6 mm 0 degree hysteroscope was inserted, with findings as noted above. The soft tissue morcellator was then used to remove retained tissue under ultrasound guidance. Due to difficulty with visualization. The hysteroscope was removed.    Suction D&C was performed with ultrasound guidance to remove remaining tissue.     The tenaculum and speculum were removed. Pressure was applied to the cervix, and it was noted to be hemostatic.    1000mcg of cytotec was placed per rectum.     She was awoken from anesthesia and taken to the recovery room in good condition. She tolerated the procedure well.

## 2025-03-27 NOTE — DISCHARGE INSTRUCTIONS
For the next two weeks:  -Nothing in the vagina (no sex, no tampons)  -take OTC ibuprofen/tylenol as needed for pain  -Call the office for vaginal bleeding more than a period or bleeding that soaks more than one pad per hour  -Call the office for fever >100.5  -Call the office for pain not relieved by pain medication    You received a drug called Toradol which is an Anti Inflammatory at: 0930AM   If you are allowed to take Anti inflammatories:    Do not take any Anti Inflammatory like Motrin, Aleve or Ibuprophen until after: 330PM  Please report any suspected allergic reactions or bleeding issues to your doctor

## 2025-03-27 NOTE — ANESTHESIA PREPROCEDURE EVALUATION
PRE-OP EVALUATION    Patient Name: Isabelle Crockett    Admit Diagnosis: RETAINED PRODUCTS    Pre-op Diagnosis: RETAINED PRODUCTS    HYSTEROSCOPY, SUCTION DILATION AND CURETTAGE, ULTRASOUND GUIDANCE    Anesthesia Procedure: HYSTEROSCOPY, SUCTION DILATION AND CURETTAGE, ULTRASOUND GUIDANCE (Uterus)    Surgeons and Role:     * Charisse Ruiz MD - Primary    Pre-op vitals reviewed.  Temp: 97.1 °F (36.2 °C)  Pulse: 73  Resp: 18  BP: 121/77  SpO2: 100 %  Body mass index is 27.12 kg/m².    Current medications reviewed.  Hospital Medications:   [Transfer Hold] acetaminophen (Tylenol Extra Strength) tab 1,000 mg  1,000 mg Oral Once    lactated ringers infusion   Intravenous Continuous    doxycycline hyclate (Vibramycin) 200 mg in sodium chloride 0.9% 250 mL IVPB  200 mg Intravenous Once       Outpatient Medications:   Prescriptions Prior to Admission[1]    Allergies: Adhesive tape      Anesthesia Evaluation    Patient summary reviewed.    Anesthetic Complications  (+) history of anesthetic complications  History of: PONV       GI/Hepatic/Renal                            (+) irritable bowel syndrome     Cardiovascular    Negative cardiovascular ROS.    Exercise tolerance: good     MET: >4                        (+) dysrhythmias (palpitations)                   Endo/Other    Negative endo/other ROS.                              Pulmonary    Negative pulmonary ROS.                       Neuro/Psych    Negative neuro/psych ROS.                                  Past Surgical History:   Procedure Laterality Date    Hand/finger surgery unlisted      Knee scope,med/lat menisectomy  9/19/2011    Performed by NIL[facility]IN[facility] [place] [facility] [place]    Other surgical history  2006    lateral and medial meniscus tears right knee    Other surgical history  2/2014    L ovarian cystectomy    Other surgical history  3/2016    ORIF R hand    Other surgical history      benign breast bx, benign lumpectomy    Removal of  ovarian cyst(s)      Upper gi endoscopy,diagnosis  08/22/2017    normal, gastric and SB Bx negative     Social History     Socioeconomic History    Marital status: Single   Tobacco Use    Smoking status: Never    Smokeless tobacco: Never   Vaping Use    Vaping status: Never Used   Substance and Sexual Activity    Alcohol use: Not Currently     Comment: 1x/wk - 2 drinks    Drug use: No    Sexual activity: Yes     Partners: Male     Birth control/protection: Rhythm     Comment: 1 male partner in past year - OCP, condom     History   Drug Use No     Available pre-op labs reviewed.  Lab Results   Component Value Date    WBC 17.3 (H) 02/09/2025    RBC 2.90 (L) 02/09/2025    HGB 9.3 (L) 02/09/2025    HCT 26.2 (L) 02/09/2025    MCV 90.3 02/09/2025    MCH 32.1 02/09/2025    MCHC 35.5 02/09/2025    RDW 13.1 02/09/2025    .0 02/09/2025               Airway      Mallampati: I  Mouth opening: >3 FB  TM distance: > 6 cm  Neck ROM: full Cardiovascular    Cardiovascular exam normal.  Rhythm: regular  Rate: normal     Dental    Dentition appears grossly intact         Pulmonary    Pulmonary exam normal.  Breath sounds clear to auscultation bilaterally.               Other findings              ASA: 2   Plan: MAC  NPO status verified and patient meets guidelines.  Patient has not taken beta blockers in last 24 hours.  Post-procedure pain management plan discussed with surgeon and patient.      Plan/risks discussed with: patient and mother                Present on Admission:  **None**             [1]   Medications Prior to Admission   Medication Sig Dispense Refill Last Dose/Taking    Tretinoin 0.05 % External Cream Apply a pea sized amount to the face and back   Taking    triamcinolone 0.1 % External Cream Apply to affected area of the left antecubital bid for up to 2 weeks, follow with a two week break. Repeat cycle as needed.   Taking    prenatal vitamin with DHA 27-0.8-228 MG Oral Cap Take 1 capsule by mouth daily.    Taking    docusate sodium 100 MG Oral Cap Take 100 mg by mouth 2 (two) times daily. (Patient not taking: Reported on 3/26/2025) 28 capsule 0 Not Taking    ibuprofen 600 MG Oral Tab Take 1 tablet (600 mg total) by mouth every 6 (six) hours as needed for Pain. (Patient not taking: Reported on 3/26/2025) 25 tablet 0 Not Taking    HYDROcodone-acetaminophen 5-325 MG Oral Tab Take 1 tablet by mouth every 6 (six) hours as needed for Pain. (Patient not taking: Reported on 3/26/2025) 15 tablet 0 Not Taking    amitriptyline 25 MG Oral Tab Take 12.5 mg (half tablet) daily at bedtime for the first two weeks and then increase to 25 mg (one full tablet) at bedtime daily. (Patient not taking: Reported on 3/26/2025) 90 tablet 0 Not Taking

## (undated) DEVICE — CATHETER,URETHRAL,REDRUBBER,STRL,16FR: Brand: MEDLINE

## (undated) DEVICE — NEEDLE SPNL 22GA L3.5IN BLK QNCKE STYL DISP

## (undated) DEVICE — Device

## (undated) DEVICE — ELITE HYSTEROSCOPE SEAL: Brand: TRUCLEAR

## (undated) DEVICE — SPECIMEN SOCK - STANDARD: Brand: MEDI-VAC

## (undated) DEVICE — SET TB INFLO FOR TRUCLEAR SYS HYSTEROLUX

## (undated) DEVICE — SYRINGE MED 10ML LL CTRL W/ FNGR GRP CLR BRL

## (undated) DEVICE — SLEEVE COMPR MD KNEE LEN SGL USE KENDALL SCD

## (undated) DEVICE — PACK GYNE CUSTOM

## (undated) DEVICE — SOFT TISSUE SHAVER MINI: Brand: TRUCLEAR

## (undated) DEVICE — MEDI-VAC NON-CONDUCTIVE SUCTION TUBING: Brand: CARDINAL HEALTH

## (undated) DEVICE — 2000CC GUARDIAN II: Brand: GUARDIAN

## (undated) DEVICE — HYSTEROSCOPIC OUTFLOW TUBE SET

## (undated) DEVICE — GLOVE SUR 6 SENSICARE PI PIP CRM PWD F

## (undated) DEVICE — SOLUTION IRRIG 1000ML 0.9% NACL USP BTL

## (undated) DEVICE — SOLUTION IRRIG 3000ML 0.9% NACL FLX CONT

## (undated) DEVICE — CURETTE VAC 8MM CRV VACURET

## (undated) DEVICE — PREMIUM WET SKIN PREP TRAY: Brand: MEDLINE INDUSTRIES, INC.